# Patient Record
Sex: MALE | Race: ASIAN | NOT HISPANIC OR LATINO | URBAN - METROPOLITAN AREA
[De-identification: names, ages, dates, MRNs, and addresses within clinical notes are randomized per-mention and may not be internally consistent; named-entity substitution may affect disease eponyms.]

---

## 2020-04-09 ENCOUNTER — EMERGENCY (EMERGENCY)
Facility: HOSPITAL | Age: 25
LOS: 1 days | Discharge: HOME | End: 2020-04-09
Attending: EMERGENCY MEDICINE
Payer: COMMERCIAL

## 2020-04-09 VITALS
TEMPERATURE: 100 F | RESPIRATION RATE: 16 BRPM | OXYGEN SATURATION: 95 % | HEART RATE: 115 BPM | HEIGHT: 67 IN | WEIGHT: 160.06 LBS | SYSTOLIC BLOOD PRESSURE: 156 MMHG | DIASTOLIC BLOOD PRESSURE: 88 MMHG

## 2020-04-09 DIAGNOSIS — Z90.49 ACQUIRED ABSENCE OF OTHER SPECIFIED PARTS OF DIGESTIVE TRACT: Chronic | ICD-10-CM

## 2020-04-09 DIAGNOSIS — Z98.890 OTHER SPECIFIED POSTPROCEDURAL STATES: Chronic | ICD-10-CM

## 2020-04-09 PROCEDURE — 99284 EMERGENCY DEPT VISIT MOD MDM: CPT

## 2020-04-09 RX ORDER — ALBUTEROL 90 UG/1
2 AEROSOL, METERED ORAL ONCE
Refills: 0 | Status: COMPLETED | OUTPATIENT
Start: 2020-04-09 | End: 2020-04-09

## 2020-04-09 RX ADMIN — ALBUTEROL 2 PUFF(S): 90 AEROSOL, METERED ORAL at 23:43

## 2020-04-09 NOTE — ED PROVIDER NOTE - PHYSICAL EXAMINATION
CONSTITUTIONAL: Nontoxic, well nourished, well developed, young male, resting comfortably in no acute distress  HEAD: Normocephalic; atraumatic  EYES: Normal inspection, EOMI  ENMT: External appears normal; normal oropharynx  NECK: Supple; non-tender; no cervical lymphadenopathy  CARD: RRR; no audible murmurs, rubs, or gallops  RESP: No respiratory distress, lungs ctab/l, poor air movement   ABD: Soft, non-distended; non-tender; no rebound or guarding  EXT: No LE pitting edema or calf tenderness; distal pulses intact with good capillary refill  SKIN: Warm, dry, intact  NEURO: aaox3, moving all extremities spontaneously

## 2020-04-09 NOTE — ED PROVIDER NOTE - PATIENT PORTAL LINK FT
You can access the FollowMyHealth Patient Portal offered by [facility] by registering at the following website: [email or web address]. By joining [facility] [employer]’s FollowMyHealth portal, you will also be able to view your health information using other applications (apps) compatible with our system.

## 2020-04-09 NOTE — ED PROVIDER NOTE - NS ED ATTENDING STATEMENT MOD
I have personally seen and examined this patient.  I have fully participated in the care of this patient. I have reviewed all pertinent clinical information, including history, physical exam, plan and the Resident’s note and agree except as noted.
Attending Attestation (For Attendings USE Only)...

## 2020-04-09 NOTE — ED PROVIDER NOTE - ATTENDING CONTRIBUTION TO CARE
MD Florez:  patient seen and evaluated personally.   I agree with the History & Physical,  Impression & Plan other than what was detailed in my note.  MD Florez    26 y/o m w/ no hx of astmha, smoking, vaping, presenting to ed w/ one week of chest tightness, now having sob that is worse when he wakes up after sleeping/laying down. no sob when sitting up, not worse with exertion, able to hold breath. no body aches ha, f/ c n/v, no hx of dvt/pe, no palpitations, no leg swelling, recent travels, afebrile, tachycardic, well appaering, slightly anxious appearing, lungs clear on my exam, resident noticed decreased so gave albuterol, hr 105 at bs, 02 98%, abd soft, no leg swelling. pt has pos covid expsoure. discussed covid like symptoms. and rtrted. unlikely emergent pathology, does not seem consistent w/ bacterial pna, pe, acs. plan to dc home

## 2020-04-09 NOTE — ED ADULT NURSE NOTE - OBJECTIVE STATEMENT
25YOM no pmh presents to ED c/o sob, fever, chills. Pt dad at home has similar symptoms. Denies CP, N/V/D, abd pain, burning upon urination.  Safety and comfort measures provided. Will continue to monitor. 25YOM no pmh presents to ED c/o sob, fever, chills x 1week. Worsening over 1 day.  Pt dad at home has similar symptoms. Denies CP, N/V/D, abd pain, burning upon urination.  Safety and comfort measures provided. Will continue to monitor.

## 2020-04-09 NOTE — ED PROVIDER NOTE - NSFOLLOWUPINSTRUCTIONS_ED_ALL_ED_FT
For at least 3 days (72 hours) after your fevers have resolved without the use of fever-reducing medications (Tylenol/acetaminophen) AND improvement of your respiratory symptoms (cough, shortness of breath, etc.) AND at least 7 days after the start of your symptoms:    -Stay inside your home as much as possible, avoiding public places or public interaction  -Do not go to work  -If you do enter any public domain, at minimum wear a surgical mask at all times  -Even while indoors, attempt to remain isolated from other individuals such as family or friends, as much as possible  -Return to the emergency room for any symptoms such as worsening shortness of breath, significant worsening cough, high fevers despite antipyretics, or severe weakness/malaise

## 2020-04-09 NOTE — ED PROVIDER NOTE - CLINICAL SUMMARY MEDICAL DECISION MAKING FREE TEXT BOX
Kathleen Rodríguez MD: Pt is 24yo M with no significant past medical history presenting with a fever, cough, shortness of breath, chest tightness for 1 week. Pt hemodynamically stable, afebrile, not hypoxic. Well appearing. Poor air movement on exam. Will treat with albuterol inhaler and ambulating sat, likely dc

## 2020-04-09 NOTE — ED PROVIDER NOTE - PROGRESS NOTE DETAILS
Kathleen Rodríguez MD: 96% ambulating sat Kathleen Rodríguez MD: Air movement improved after albuterol inhaler. Pt remains stable and not hypoxic. Will dc home with precautions.

## 2020-04-09 NOTE — ED PROVIDER NOTE - OBJECTIVE STATEMENT
Kathleen Rodríguez MD: Pt is 24yo M with no significant past medical history presenting with a fever, cough, shortness of breath, chest tightness for 1 week. Symptoms have been getting progressively worse for 1 day. SOB is not worse with exertion. +sick contacts at home that are suspected COVID. Tylenol at 6PM.

## 2020-04-10 VITALS
OXYGEN SATURATION: 100 % | SYSTOLIC BLOOD PRESSURE: 125 MMHG | RESPIRATION RATE: 20 BRPM | DIASTOLIC BLOOD PRESSURE: 72 MMHG | TEMPERATURE: 99 F | HEART RATE: 95 BPM

## 2020-04-10 PROCEDURE — 94640 AIRWAY INHALATION TREATMENT: CPT

## 2020-04-10 PROCEDURE — 99283 EMERGENCY DEPT VISIT LOW MDM: CPT | Mod: 25

## 2020-04-10 RX ORDER — ACETAMINOPHEN 500 MG
975 TABLET ORAL ONCE
Refills: 0 | Status: COMPLETED | OUTPATIENT
Start: 2020-04-10 | End: 2020-04-10

## 2020-04-10 RX ORDER — ACETAMINOPHEN 500 MG
975 TABLET ORAL ONCE
Refills: 0 | Status: DISCONTINUED | OUTPATIENT
Start: 2020-04-10 | End: 2020-04-10

## 2020-04-10 RX ADMIN — Medication 975 MILLIGRAM(S): at 00:40

## 2022-03-15 NOTE — ED ADULT NURSE NOTE - NSFALLRSKHARMRISK_ED_ALL_ED
03/14/22 0836   Team Discussion   Participants MD Chris, Jammie, RN, NORBERTO Moura, Kathie Vasquez OT   Progress improving   Anticipated length of stay 2 weeks   Continued Stay Criteria/Rationale Patient in need of placement, CADI waiver reinstatement is currently in process.   Medical/Physical Knee pain.   Plan coordinate with [facility] and Angela at [facility] for placement.   Rationale for change in precautions or plan no change      no
